# Patient Record
Sex: FEMALE | URBAN - METROPOLITAN AREA
[De-identification: names, ages, dates, MRNs, and addresses within clinical notes are randomized per-mention and may not be internally consistent; named-entity substitution may affect disease eponyms.]

---

## 2020-03-17 ENCOUNTER — OFFICE VISIT (OUTPATIENT)
Dept: URBAN - METROPOLITAN AREA CLINIC 48 | Facility: CLINIC | Age: 65
End: 2020-03-17
Payer: COMMERCIAL

## 2020-03-17 DIAGNOSIS — H40.013 GLAUCOMA SUSPECT - LOW RISK BILATERAL: Primary | ICD-10-CM

## 2020-03-17 DIAGNOSIS — H25.13 AGE-RELATED NUCLEAR CATARACT, BILATERAL: ICD-10-CM

## 2020-03-17 PROCEDURE — 99204 OFFICE O/P NEW MOD 45 MIN: CPT | Performed by: OPHTHALMOLOGY

## 2020-03-17 PROCEDURE — 92133 CPTRZD OPH DX IMG PST SGM ON: CPT | Performed by: OPHTHALMOLOGY

## 2020-03-17 PROCEDURE — 92020 GONIOSCOPY: CPT | Performed by: OPHTHALMOLOGY

## 2020-03-17 PROCEDURE — 76514 ECHO EXAM OF EYE THICKNESS: CPT | Performed by: OPHTHALMOLOGY

## 2020-03-17 PROCEDURE — 92083 EXTENDED VISUAL FIELD XM: CPT | Performed by: OPHTHALMOLOGY

## 2020-03-17 ASSESSMENT — KERATOMETRY
OS: 46.13
OD: 45.75

## 2020-03-17 ASSESSMENT — INTRAOCULAR PRESSURE
OS: 20
OD: 22

## 2020-03-17 ASSESSMENT — VISUAL ACUITY
OD: 20/50
OS: 20/30

## 2020-03-17 NOTE — IMPRESSION/PLAN
Impression: Glaucoma Suspect - Low Risk Bilateral: H40.013. Plan: Borderline IOP OD, thin CCT and enlarged CDR OD.  VF and RNFL is normal.
 Discussed and reviewed diagnosis with patient today, understood by patient, large optic nerve disc, no evidence of glaucoma, will continue regular monitoring with testing.

## 2020-03-17 NOTE — IMPRESSION/PLAN
Impression: Age-related nuclear cataract, bilateral: H25.13. Plan: The patient has a visually significant cataract in both eyes, after discussion with the patient and careful examination it has been determined that a cataract in both eyes is accounting for a significant amount of the patient's visual symptoms. Cataract surgery and the associated risks, benefits, alternatives, expectations, and recovery were discussed in detail with the patient. All questions were answered. The patient understands that there may be some limitation in visual potential given any pre-existing ocular disease. The patient desires cataract surgery in both eyes. Patient desires standard lens for distance target. Patient elects to use (ERX standard drops ). All potential side effects and benefits of medication discussed . Patient is to start drop(s) to operative eye one day prior to surgery. Schedule cataract surgery in both eyes; right eye, then left eye. RL2

## 2020-10-08 ENCOUNTER — OFFICE VISIT (OUTPATIENT)
Dept: URBAN - METROPOLITAN AREA CLINIC 48 | Facility: CLINIC | Age: 65
End: 2020-10-08
Payer: COMMERCIAL

## 2020-10-08 DIAGNOSIS — H25.12 AGE-RELATED NUCLEAR CATARACT, LEFT EYE: ICD-10-CM

## 2020-10-08 PROCEDURE — 92014 COMPRE OPH EXAM EST PT 1/>: CPT | Performed by: OPHTHALMOLOGY

## 2020-10-08 ASSESSMENT — INTRAOCULAR PRESSURE
OD: 20
OS: 22

## 2020-10-08 ASSESSMENT — VISUAL ACUITY
OS: 20/70
OD: 20/60

## 2020-10-08 NOTE — IMPRESSION/PLAN
Impression: Age-related nuclear cataract, bilateral: H25.13. Bilateral. Plan: The patient has a visually significant cataract in both eyes, after discussion with the patient and careful examination it has been determined that a cataract in both eyes is accounting for a significant amount of the patient's visual symptoms. Cataract surgery and the associated risks, benefits, alternatives, expectations, and recovery were discussed in detail with the patient. All questions were answered. The patient understands that there may be some limitation in visual potential given any pre-existing ocular disease. The patient desires cataract surgery in both eyes. Patient desires standard lens for distance target. Patient candidate for Premium lens, interested in pricing. Patient elects to use (ERX standard drops ). All potential side effects and benefits of medication discussed . Patient is to start drop(s) to operative eye one day prior to surgery. Schedule cataract surgery in both eyes; right eye, then left eye.  RL2

## 2020-10-09 ENCOUNTER — TESTING ONLY (OUTPATIENT)
Dept: URBAN - METROPOLITAN AREA CLINIC 48 | Facility: CLINIC | Age: 65
End: 2020-10-09
Payer: COMMERCIAL

## 2020-10-09 PROCEDURE — 92136 OPHTHALMIC BIOMETRY: CPT | Performed by: OPHTHALMOLOGY

## 2020-10-09 ASSESSMENT — PACHYMETRY
OD: 22.13
OS: 2.64
OS: 22.08
OD: 2.76

## 2020-10-09 ASSESSMENT — KERATOMETRY
OD: 45.83
OS: 45.62

## 2020-10-21 ENCOUNTER — SURGERY (OUTPATIENT)
Dept: URBAN - METROPOLITAN AREA SURGERY 26 | Facility: SURGERY | Age: 65
End: 2020-10-21
Payer: COMMERCIAL

## 2020-10-21 ENCOUNTER — POST-OPERATIVE VISIT (OUTPATIENT)
Dept: URBAN - METROPOLITAN AREA CLINIC 48 | Facility: CLINIC | Age: 65
End: 2020-10-21
Payer: COMMERCIAL

## 2020-10-21 DIAGNOSIS — Z48.810 ENCOUNTER FOR SURGICAL AFTERCARE FOLLOWING SURGERY ON A SENSE ORGAN: Primary | ICD-10-CM

## 2020-10-21 PROCEDURE — 66984 XCAPSL CTRC RMVL W/O ECP: CPT | Performed by: OPHTHALMOLOGY

## 2020-10-21 PROCEDURE — 99024 POSTOP FOLLOW-UP VISIT: CPT | Performed by: OPHTHALMOLOGY

## 2020-10-21 ASSESSMENT — INTRAOCULAR PRESSURE
OD: 24
OD: 24

## 2020-10-21 NOTE — IMPRESSION/PLAN
Impression: S/P CE/Standard IOL/SA60WF+23.5 OD - . Encounter for surgical aftercare following surgery on a sense organ  Z48.810. Plan: Continue Pred/Oflox/Ket QID OD
went over post operative care and precaution RTC 1wk PO2

## 2020-10-28 ENCOUNTER — POST-OPERATIVE VISIT (OUTPATIENT)
Dept: URBAN - METROPOLITAN AREA CLINIC 48 | Facility: CLINIC | Age: 65
End: 2020-10-28
Payer: COMMERCIAL

## 2020-10-28 PROCEDURE — 99024 POSTOP FOLLOW-UP VISIT: CPT | Performed by: OPTOMETRIST

## 2020-10-28 ASSESSMENT — INTRAOCULAR PRESSURE: OD: 14

## 2020-11-04 ENCOUNTER — SURGERY (OUTPATIENT)
Dept: URBAN - METROPOLITAN AREA SURGERY 26 | Facility: SURGERY | Age: 65
End: 2020-11-04
Payer: COMMERCIAL

## 2020-11-04 ENCOUNTER — POST-OPERATIVE VISIT (OUTPATIENT)
Dept: URBAN - METROPOLITAN AREA CLINIC 48 | Facility: CLINIC | Age: 65
End: 2020-11-04
Payer: COMMERCIAL

## 2020-11-04 PROCEDURE — 99024 POSTOP FOLLOW-UP VISIT: CPT | Performed by: OPHTHALMOLOGY

## 2020-11-04 PROCEDURE — 66984 XCAPSL CTRC RMVL W/O ECP: CPT | Performed by: OPHTHALMOLOGY

## 2020-11-04 RX ORDER — BRIMONIDINE TARTRATE 2 MG/ML
0.2 % SOLUTION/ DROPS OPHTHALMIC
Qty: 5 | Refills: 1 | Status: INACTIVE
Start: 2020-11-04 | End: 2020-11-30

## 2020-11-04 ASSESSMENT — INTRAOCULAR PRESSURE
OS: 46
OS: 46

## 2020-11-04 NOTE — IMPRESSION/PLAN
Impression: S/P CE/Standard IOL OS - . Presence of intraocular lens  Z96.1.  Plan: IOP OS elevated post CE/IOL @ 46, @18 post burp
start Brim BID OS to lower IOP
continue Oflox/Pred/Ket QID OS
went over precautions with patient in room

## 2020-11-11 ENCOUNTER — POST-OPERATIVE VISIT (OUTPATIENT)
Dept: URBAN - METROPOLITAN AREA CLINIC 48 | Facility: CLINIC | Age: 65
End: 2020-11-11
Payer: COMMERCIAL

## 2020-11-11 PROCEDURE — 99024 POSTOP FOLLOW-UP VISIT: CPT | Performed by: STUDENT IN AN ORGANIZED HEALTH CARE EDUCATION/TRAINING PROGRAM

## 2020-11-11 ASSESSMENT — INTRAOCULAR PRESSURE
OS: 19
OD: 21

## 2020-11-11 NOTE — IMPRESSION/PLAN
Impression: S/P Cataract Extraction by phacoemulsification with IOL placement- KA20PT+84.69 OS - 7 Days. Presence of intraocular lens  Z96.1. Excellent post op course   Post operative instructions reviewed - Plan: Patient to use PF and KET tid OS continue taper PF  bid OD continue taper AFT  1-4 times a day OU Patient may use +2.50 readers or what is more comfortable for patient RTC 3 weeks PO 3

## 2020-12-04 ENCOUNTER — POST-OPERATIVE VISIT (OUTPATIENT)
Dept: URBAN - METROPOLITAN AREA CLINIC 48 | Facility: CLINIC | Age: 65
End: 2020-12-04
Payer: COMMERCIAL

## 2020-12-04 PROCEDURE — 99024 POSTOP FOLLOW-UP VISIT: CPT | Performed by: OPTOMETRIST

## 2020-12-04 NOTE — IMPRESSION/PLAN
Impression: S/P CE/Standard IOL OS - 30 Days. Presence of intraocular lens  Z96.1. Plan: off all PO gtts Cont AT PRN, recommend reading glasses RD precautions RTO 4 months for IOP check for glaucoma susp as per Dr Timbo Goel, also cap check

## 2020-12-11 ENCOUNTER — OFFICE VISIT (OUTPATIENT)
Dept: URBAN - METROPOLITAN AREA CLINIC 48 | Facility: CLINIC | Age: 65
End: 2020-12-11
Payer: COMMERCIAL

## 2020-12-11 DIAGNOSIS — H20.042 SECONDARY NONINFECTIOUS IRIDOCYCLITIS, LEFT EYE: Primary | ICD-10-CM

## 2020-12-11 PROCEDURE — 99024 POSTOP FOLLOW-UP VISIT: CPT | Performed by: OPTOMETRIST

## 2020-12-11 ASSESSMENT — INTRAOCULAR PRESSURE
OS: 22
OD: 17

## 2020-12-11 NOTE — IMPRESSION/PLAN
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 rebound Plan: Cont Pred 6x daily x 2 days, then TID until seen RTO 2 weeks PO

## 2020-12-28 ENCOUNTER — POST-OPERATIVE VISIT (OUTPATIENT)
Dept: URBAN - METROPOLITAN AREA CLINIC 48 | Facility: CLINIC | Age: 65
End: 2020-12-28
Payer: COMMERCIAL

## 2020-12-28 DIAGNOSIS — Z96.1 PRESENCE OF INTRAOCULAR LENS: Primary | ICD-10-CM

## 2020-12-28 PROCEDURE — 99024 POSTOP FOLLOW-UP VISIT: CPT | Performed by: OPTOMETRIST

## 2020-12-28 ASSESSMENT — INTRAOCULAR PRESSURE
OD: 18
OS: 21

## 2020-12-28 NOTE — IMPRESSION/PLAN
Impression: S/P CE/Standard IOL OS - 54 Days. Presence of intraocular lens  Z96.1. Excellent post op course   Post operative instructions reviewed - Condition is improving - Will continue to monitor with out drops. Patient to call office if patient has recurrent symptoms, sensitivity lights, pain, flashes of light, floaters . RD precautions discussed with patient.  Plan: Keep appointment with Dr. Ajay Chaidez in April 2021

## 2021-05-03 ENCOUNTER — OFFICE VISIT (OUTPATIENT)
Dept: URBAN - METROPOLITAN AREA CLINIC 48 | Facility: CLINIC | Age: 66
End: 2021-05-03
Payer: COMMERCIAL

## 2021-05-03 PROCEDURE — 99214 OFFICE O/P EST MOD 30 MIN: CPT | Performed by: OPHTHALMOLOGY

## 2021-05-03 ASSESSMENT — INTRAOCULAR PRESSURE
OD: 20
OS: 23

## 2021-05-03 NOTE — IMPRESSION/PLAN
Impression: Glaucoma Suspect - Low Risk Bilateral: H40.013. Plan: IOP borderline high OU. Patient to come back in 6wk for testing.
